# Patient Record
Sex: MALE | Race: WHITE | Employment: OTHER | URBAN - METROPOLITAN AREA
[De-identification: names, ages, dates, MRNs, and addresses within clinical notes are randomized per-mention and may not be internally consistent; named-entity substitution may affect disease eponyms.]

---

## 2021-07-19 ENCOUNTER — HOSPITAL ENCOUNTER (OUTPATIENT)
Age: 71
Discharge: HOME | End: 2021-07-21
Payer: MEDICARE

## 2021-07-19 ENCOUNTER — HOSPITAL ENCOUNTER (OUTPATIENT)
Dept: GENERAL RADIOLOGY | Age: 71
Discharge: HOME | End: 2021-07-21
Payer: MEDICARE

## 2021-07-19 DIAGNOSIS — J44.9 STAGE 4 VERY SEVERE COPD BY GOLD CLASSIFICATION (HCC): ICD-10-CM

## 2021-07-19 PROCEDURE — 71046 X-RAY EXAM CHEST 2 VIEWS: CPT

## 2022-03-21 ENCOUNTER — PREP FOR PROCEDURE (OUTPATIENT)
Dept: SURGERY | Age: 72
End: 2022-03-21

## 2022-03-21 RX ORDER — SODIUM CHLORIDE 9 MG/ML
INJECTION, SOLUTION INTRAVENOUS CONTINUOUS
Status: CANCELLED | OUTPATIENT
Start: 2022-03-21

## 2022-03-21 NOTE — H&P (VIEW-ONLY)
Date:   [facility]  Name: Britany Figueroa               : 1950 Sex: M  Age: 70 yrs  Acct#:  [id number]            CC:  Rectal bleeding and diarrhea    HPI: [The patient is a 77-year-old white male who presents with moderate rectal bleeding and diarrhea. Presently, the patient states that the symptoms are intermittent. Patient denies any antibiotic use. The patient denies well water and travel. The patient has never been treated for diverticular disease or inflammatory bowel disease. The patient denies a family history for colorectal cancer. ]    Meds Prior to Visit:  Metformin HCL     Losartan Potassium     Atorvastatin Calcium     Trelegy Ellipta     Ipratropium Bromide/Albuterol Sulfate        Allergies:  Fosamax, Ace Inhibitors    PMH:  Problem List: Screening for malignant neoplasm of colon, Diarrhea  (Health Maintenance)  Medical Problems:  Respiratory, Type 2 Diabetes  Surgical Hx:  Double Hernia - () ST E'S  Splenectomy - () ST E'S/HRITZO  Reviewed and updated. FH:  Father:  . (Hx)  Mother:  . (Hx)  Reviewed and updated. SH:  Personal Habits:  Tobacco Use: Patient is a former smoker. Alcohol: Denies alcohol use. Drug Use: Denies Drug Use. Daily Caffeine: Uses Caffeine. Reviewed and updated. ROS:  Const: Denies anorexia, anxiety, fatigue, night sweats, weight gain and weight loss. Eyes: Denies eye symptoms. ENMT: Denies ear symptoms. Denies nasal symptoms. Denies mouth or throat symptoms. CV: Denies hypertension and other cardiovascular symptoms. Resp: Denies respiratory symptoms. GI: Denies hepatitis, liver disease and other gastrointestinal symptoms. Musculo: Denies musculoskeletal symptoms. Skin: Denies skin, hair and nail symptoms. Breast: Denies breast problems. Neuro: Denies neurologic symptoms. Psych: Denies depression and substance abuse. Endocrine: Denies diabetes, kidney disease and thyroid disease.   Hema/Lymph: Denies anemia, blood

## 2022-03-21 NOTE — H&P
Date:   22[facility]PRE[facility]  Name: Elsa Lopez               : 1950 Sex: M  Age: 70 yrs  Acct#:  [id number]            CC:  Rectal bleeding and diarrhea    HPI: [The patient is a 70-year-old white male who presents with moderate rectal bleeding and diarrhea. Presently, the patient states that the symptoms are intermittent. Patient denies any antibiotic use. The patient denies well water and travel. The patient has never been treated for diverticular disease or inflammatory bowel disease. The patient denies a family history for colorectal cancer. ]    Meds Prior to Visit:  Metformin HCL     Losartan Potassium     Atorvastatin Calcium     Trelegy Ellipta     Ipratropium Bromide/Albuterol Sulfate        Allergies:  Fosamax, Ace Inhibitors    PMH:  Problem List: Screening for malignant neoplasm of colon, Diarrhea  (Health Maintenance)  Medical Problems:  Respiratory, Type 2 Diabetes  Surgical Hx:  Double Hernia - () ST E'S  Splenectomy - () ST E'S/HRITZO  Reviewed and updated. FH:  Father:  . (Hx)  Mother:  . (Hx)  Reviewed and updated. SH:  Personal Habits:  Tobacco Use: Patient is a former smoker. Alcohol: Denies alcohol use. Drug Use: Denies Drug Use. Daily Caffeine: Uses Caffeine. Reviewed and updated. ROS:  Const: Denies anorexia, anxiety, fatigue, night sweats, weight gain and weight loss. Eyes: Denies eye symptoms. ENMT: Denies ear symptoms. Denies nasal symptoms. Denies mouth or throat symptoms. CV: Denies hypertension and other cardiovascular symptoms. Resp: Denies respiratory symptoms. GI: Denies hepatitis, liver disease and other gastrointestinal symptoms. Musculo: Denies musculoskeletal symptoms. Skin: Denies skin, hair and nail symptoms. Breast: Denies breast problems. Neuro: Denies neurologic symptoms. Psych: Denies depression and substance abuse. Endocrine: Denies diabetes, kidney disease and thyroid disease.   Hema/Lymph: Denies anemia, blood disease, cancer and past transfusion. Allergy/Immuno: Denies immunosuppression. Reviewed and updated. Ht: 71\" 5'11\" Wt: 163lb BMI: 22.7    Exam:    Neck is supple without adenopathy  Lungs are clear to auscultation  Cardiac regular rate and rhythm without murmurs rubs or gallops  Abdomen is soft, nondistended and nontender  Extremities without clubbing cyanosis or edema         Assessment #1: Hx R19.7 Diarrhea, unspecified   Care Plan:                Assessment #2: Hx K92.2 Gastrointestinal hemorrhage, unspecified   Care Plan:              Comments       : The patient will undergo a full colonoscopy. I have informed him of the risks, benefits, and complications of the procedure and he is willing to proceed. Moderate complexity    I performed a updated history, physical examination, assessment and plan.             Seen by:

## 2022-03-23 RX ORDER — ATORVASTATIN CALCIUM 10 MG/1
10 TABLET, FILM COATED ORAL DAILY
COMMUNITY

## 2022-03-23 NOTE — PROGRESS NOTES
Patient has a history of difficult intubation in 2001. He states he has not had an issue since. Dr. Sera Delgado notified. No further orders.

## 2022-03-23 NOTE — PROGRESS NOTES
Have you been tested for COVID  No           Have you been told you were positive for COVID No  Have you had any known exposure to someone that is positive for COVID No  Do you have a cough                   No              Do you have shortness of breath No                 Do you have a sore throat            No                Are you having chills                    No                Are you having muscle aches. No                    Please come to the hospital wearing a mask and have your significant other wear a mask as well. Both of you should check your temperature before leaving to come here,  if it is 100 or higher please call [phone] for instruction. [facility] PRE-ADMISSION TESTING INSTRUCTIONS    The Preadmission Testing patient is instructed accordingly using the following criteria (check applicable):    ARRIVAL INSTRUCTIONS:  [x] Parking the day of Surgery is located in the Main Entrance lot. Upon entering the door, make an immediate right to the surgery reception desk    [x] Bring photo ID and insurance card    [] Bring in a copy of Living will or Durable Power of  papers.     [x] Please be sure to arrange for responsible adult to provide transportation to and from the hospital    [x] Please arrange for responsible adult to be with you for the 24 hour period post procedure due to having anesthesia      GENERAL INSTRUCTIONS:    [x] Nothing by mouth after midnight, including gum, candy, mints or water    [x] You may brush your teeth, but do not swallow any water    [] Take medications as instructed with 1-2 oz of water    [] Stop herbal supplements and vitamins 5 days prior to procedure    [] Follow preop dosing of blood thinners per physician instructions    [] Take 1/2 dose of evening insulin, but no insulin after midnight    [x] No oral diabetic medications after midnight    [x] If diabetic and have low blood sugar or feel symptomatic, take 1-2oz apple juice only    [] Bring inhalers day of surgery    [] Bring C-PAP/ Bi-Pap day of surgery    [] Bring urine specimen day of surgery    [x] Shower or bath with soap, lather and rinse well, AM of Surgery, no lotion, powders or creams to surgical site    [x] Follow bowel prep as instructed per surgeon    [x] No tobacco products within 24 hours of surgery     [x] No alcohol or illegal drug use within 24 hours of surgery.     [x] Jewelry, body piercing's, eyeglasses, contact lenses and dentures are not permitted into surgery (bring cases)      [x] Please do not wear any nail polish, make up or hair products on the day of surgery    [x] You can expect a call the business day prior to procedure to notify you if your arrival time changes    [x] If you receive a survey after surgery we would greatly appreciate your comments    [] Parent/guardian of a minor must accompany their child and remain on the premises  the entire time they are under our care     [] Pediatric patients may bring favorite toy, blanket or comfort item with them    [] A caregiver or family member must remain with the patient during their stay if they are mentally handicapped, have dementia, disoriented or unable to use a call light or would be a safety concern if left unattended    [x] Please notify surgeon if you develop any illness between now and time of surgery (cold, cough, sore throat, fever, nausea, vomiting) or any signs of infections  including skin, wounds, and dental.    [x]  The Outpatient Pharmacy is available to fill your prescription here on your day of surgery, ask your preop nurse for details    [] Other instructions    EDUCATIONAL MATERIALS PROVIDED:    [] PAT Preoperative Education Packet/Booklet     [] Medication List    [] Transfusion bracelet applied with instructions    [] Shower with soap, lather and rinse well, and use CHG wipes provided the evening before surgery as instructed    [] Incentive spirometer with instructions

## 2022-03-25 ENCOUNTER — ANESTHESIA (OUTPATIENT)
Dept: ENDOSCOPY | Age: 72
End: 2022-03-25
Payer: MEDICARE

## 2022-03-25 ENCOUNTER — ANESTHESIA EVENT (OUTPATIENT)
Dept: ENDOSCOPY | Age: 72
End: 2022-03-25
Payer: MEDICARE

## 2022-03-25 ENCOUNTER — HOSPITAL ENCOUNTER (OUTPATIENT)
Age: 72
Setting detail: OUTPATIENT SURGERY
Discharge: HOME | End: 2022-03-25
Attending: SURGERY | Admitting: SURGERY
Payer: MEDICARE

## 2022-03-25 VITALS
RESPIRATION RATE: 16 BRPM | SYSTOLIC BLOOD PRESSURE: 126 MMHG | DIASTOLIC BLOOD PRESSURE: 73 MMHG | HEIGHT: 71 IN | OXYGEN SATURATION: 97 % | HEART RATE: 73 BPM | WEIGHT: 163 LBS | BODY MASS INDEX: 22.82 KG/M2 | TEMPERATURE: 97.3 F

## 2022-03-25 VITALS
SYSTOLIC BLOOD PRESSURE: 109 MMHG | DIASTOLIC BLOOD PRESSURE: 60 MMHG | OXYGEN SATURATION: 99 % | RESPIRATION RATE: 30 BRPM

## 2022-03-25 PROCEDURE — 7100000011 HC PHASE II RECOVERY - ADDTL 15 MIN: Performed by: SURGERY

## 2022-03-25 PROCEDURE — 2580000003 HC RX 258: Performed by: SURGERY

## 2022-03-25 PROCEDURE — 87449 NOS EACH ORGANISM AG IA: CPT

## 2022-03-25 PROCEDURE — 88305 TISSUE EXAM BY PATHOLOGIST: CPT

## 2022-03-25 PROCEDURE — 87045 FECES CULTURE AEROBIC BACT: CPT

## 2022-03-25 PROCEDURE — 7100000010 HC PHASE II RECOVERY - FIRST 15 MIN: Performed by: SURGERY

## 2022-03-25 PROCEDURE — 89055 LEUKOCYTE ASSESSMENT FECAL: CPT

## 2022-03-25 PROCEDURE — 87329 GIARDIA AG IA: CPT

## 2022-03-25 PROCEDURE — 6360000002 HC RX W HCPCS: Performed by: NURSE ANESTHETIST, CERTIFIED REGISTERED

## 2022-03-25 PROCEDURE — 3609010600 HC COLONOSCOPY POLYPECTOMY SNARE/COLD BIOPSY: Performed by: SURGERY

## 2022-03-25 PROCEDURE — 3700000001 HC ADD 15 MINUTES (ANESTHESIA): Performed by: SURGERY

## 2022-03-25 PROCEDURE — 3700000000 HC ANESTHESIA ATTENDED CARE: Performed by: SURGERY

## 2022-03-25 PROCEDURE — 87328 CRYPTOSPORIDIUM AG IA: CPT

## 2022-03-25 PROCEDURE — 2709999900 HC NON-CHARGEABLE SUPPLY: Performed by: SURGERY

## 2022-03-25 PROCEDURE — 87324 CLOSTRIDIUM AG IA: CPT

## 2022-03-25 RX ORDER — PROPOFOL 10 MG/ML
INJECTION, EMULSION INTRAVENOUS PRN
Status: DISCONTINUED | OUTPATIENT
Start: 2022-03-25 | End: 2022-03-25 | Stop reason: SDUPTHER

## 2022-03-25 RX ORDER — SODIUM CHLORIDE 9 MG/ML
INJECTION, SOLUTION INTRAVENOUS CONTINUOUS
Status: DISCONTINUED | OUTPATIENT
Start: 2022-03-25 | End: 2022-03-25 | Stop reason: HOSPADM

## 2022-03-25 RX ADMIN — SODIUM CHLORIDE: 9 INJECTION, SOLUTION INTRAVENOUS at 11:46

## 2022-03-25 RX ADMIN — PROPOFOL 230 MG: 10 INJECTION, EMULSION INTRAVENOUS at 11:48

## 2022-03-25 ASSESSMENT — COPD QUESTIONNAIRES: CAT_SEVERITY: SEVERE

## 2022-03-25 ASSESSMENT — PAIN SCALES - GENERAL
PAINLEVEL_OUTOF10: 0

## 2022-03-25 NOTE — OP NOTE
Operative Note      Patient: Ziyad Lee  YOB: 1950  MRN: [id number]    Date of Procedure: 3/25/2022    Pre-Op Diagnosis: RECTAL BLEED    Post-Op Diagnosis:     Polyp involving the ascending colon  Nonspecific colitis  Diverticular disease       Procedure(s):  COLONOSCOPY DIAGNOSTIC    Surgeon(s):  Victor Manuel Manuel MD    Assistant:   * No surgical staff found *    Anesthesia: Monitor Anesthesia Care    Estimated Blood Loss (mL): Minimal    Complications: None    Specimens:   ID Type Source Tests Collected by Time Destination   1 : stool Stool Colon CULTURE, STOOL Victor Manuel Manuel MD 3/25/2022 1153    A : cold snare polyp ascending colon Tissue Colon SURGICAL PATHOLOGY Victor Manuel Manuel MD 3/25/2022 1154    B : random colon bx Tissue Colon SURGICAL PATHOLOGY Victor Manuel Manuel MD 3/25/2022 1157        Implants:  * No implants in log *      Drains: * No LDAs found *    Findings: As above      Detailed Description of Procedure: The patient was brought to the endoscopy suite and placed on the table in the left lateral decubitus position. Patient received anesthesia per the department of anesthesiology. A digital rectal exam was performed. No gross findings were noted. The scope was inserted and passed without difficulty through the entire length of the colon. The cecum was photo documented. The endoscope was retrieved. The patient a polyp involving the ascending colon which was removed with a snare technique without cautery. The patient random biopsies obtained of the colon. Cultures were obtained. The cultures were placed in the container. The patient had left-sided diverticular disease. The rectum was normal.  The patient tolerated procedure well.     Assessment:    Nonspecific colitis  Polyp involving ascending colon    Plan:    Await pathology    Electronically signed by Victor Manuel Manuel MD on 3/25/2022 at 12:02 PM

## 2022-03-25 NOTE — INTERVAL H&P NOTE
Update History & Physical    The patient's History and Physical of March 25, 2022 was reviewed with the patient and I examined the patient. There was no change. The surgical site was confirmed by the patient and me. Plan: The risks, benefits, expected outcome, and alternative to the recommended procedure have been discussed with the patient. Patient understands and wants to proceed with the procedure.      Electronically signed by Emma Aguilera MD on 3/25/2022 at 10:34 AM

## 2022-03-25 NOTE — ANESTHESIA POSTPROCEDURE EVALUATION
Department of Anesthesiology  Postprocedure Note    Patient: Arnaldo Andrade  MRN: [id number]  YOB: 1950  Date of evaluation: 3/25/2022  Time:  12:37 PM     Procedure Summary     Date: 03/25/22 Room / Location: [facility] / [facility]    Anesthesia Start: 1024 Anesthesia Stop: 0707    Procedure: COLONOSCOPY DIAGNOSTIC (N/A ) Diagnosis: (RECTAL BLEED)    Surgeons: Carol Palacio MD Responsible Provider: Samuel Ford MD    Anesthesia Type: MAC ASA Status: 4          Anesthesia Type: MAC    Meaghan Phase I: Meaghan Score: 8    Meaghan Phase II:      Last vitals: Reviewed and per EMR flowsheets.        Anesthesia Post Evaluation    Patient location during evaluation: PACU  Patient participation: complete - patient participated  Level of consciousness: awake and alert  Airway patency: patent  Nausea & Vomiting: no nausea and no vomiting  Complications: no  Cardiovascular status: hemodynamically stable  Respiratory status: acceptable  Hydration status: euvolemic

## 2022-03-25 NOTE — ANESTHESIA PRE PROCEDURE
Department of Anesthesiology  Preprocedure Note       Name:  Saúl Camacho   Age:  70 y.o.  :  1950                                          MRN:  [id number]         Date:  3/25/2022      Surgeon: Elisa Almeida):  Jazmyn Mas MD    Procedure: Procedure(s):  COLONOSCOPY DIAGNOSTIC    Medications prior to admission:   Prior to Admission medications    Medication Sig Start Date End Date Taking? Authorizing Provider   atorvastatin (LIPITOR) 10 MG tablet Take 10 mg by mouth daily   Yes Historical Provider, MD   EPINEPHrine (PRIMATENE MIST IN) Inhale into the lungs   Yes Historical Provider, MD   fluticasone-umeclidin-vilant (TRELEGY ELLIPTA) 100-62.5-25 MCG/INH AEPB Inhale 1 puff into the lungs daily 10/5/21   Nemesio Saldivar MD   losartan (COZAAR) 25 MG tablet Take 25 mg by mouth daily    Historical Provider, MD   METFORMIN HCL Take 500 mg by mouth daily. Historical Provider, MD   ipratropium-albuterol (DUONEB) 0.5-2.5 (3) MG/3ML SOLN nebulizer solution Take 1 vial by nebulization 2 times daily     Historical Provider, MD   OXYGEN Inhale 3 L into the lungs continuous. ON PULSE DURING DAY (ACTIVITY); CONSENTRATE AT HS     Historical Provider, MD       Current medications:    Current Facility-Administered Medications   Medication Dose Route Frequency Provider Last Rate Last Admin    0.9 % sodium chloride infusion   IntraVENous Continuous Jazmyn Mas MD           Allergies:     Allergies   Allergen Reactions    Alendronate Sodium Swelling and Other (See Comments)     SKIN \"BLEEDS OUT\"    Biaxin [Clarithromycin] Swelling and Rash       Problem List:    Patient Active Problem List   Diagnosis Code    Hypertrophy, nasal, turbinate J34.3       Past Medical History:        Diagnosis Date    Asthma     Bronchiolitis     [facility]     Diabetes mellitus ([facility]     Diarrhea     Difficult intubation     GERD (gastroesophageal reflux disease)     Hyperlipidemia     Hypertension     Nausea & vomiting     \"WITH GAS\"    Rectal bleeding        Past Surgical History:        Procedure Laterality Date    BRONCHOSCOPY      HERNIA REPAIR  2009    NASAL ENDOSCOPY  2011    bilateral inferior, middle turbinoplasty with laser    OTHER SURGICAL HISTORY      NASAL SURGERY X2, SLEENECTOMY    SPLENECTOMY         Social History:    Social History     Tobacco Use    Smoking status: Former Smoker     Packs/day: 0.50     Years: 40.00     Pack years: 20.00     Quit date: 2002     Years since quittin.2    Smokeless tobacco: Never Used   Substance Use Topics    Alcohol use: No                                Counseling given: Not Answered      Vital Signs (Current):   Vitals:    22 1024   Weight: 163 lb (73.9 kg)   Height: 5' 11\" (1.803 m)                                              BP Readings from Last 3 Encounters:   21 126/70   20 120/78   19 124/80       NPO Status:                                                                                 BMI:   Wt Readings from Last 3 Encounters:   22 163 lb (73.9 kg)   21 164 lb (74.4 kg)   20 177 lb (80.3 kg)     Body mass index is 22.73 kg/m². CBC: No results found for: WBC, RBC, HGB, HCT, MCV, RDW, PLT    CMP: No results found for: NA, K, CL, CO2, BUN, CREATININE, GFRAA, AGRATIO, LABGLOM, GLUCOSE, GLU, PROT, CALCIUM, BILITOT, ALKPHOS, AST, ALT    POC Tests: No results for input(s): POCGLU, POCNA, POCK, POCCL, POCBUN, POCHEMO, POCHCT in the last 72 hours.     Coags: No results found for: PROTIME, INR, APTT    HCG (If Applicable):   Lab Results   Component Value Date    HCG <2.0 2012        ABGs: No results found for: PHART, PO2ART, BXM7EWS, VRF6OYK, BEART, G4FIFDAY     Type & Screen (If Applicable):  No results found for: LABABO, LABRH    Drug/Infectious Status (If Applicable):  No results found for: HIV, HEPCAB    COVID-19 Screening (If Applicable): No results found for: COVID19        Anesthesia Evaluation  Patient summary reviewed history of anesthetic complications:   Airway: Mallampati: I  TM distance: >3 FB   Neck ROM: full   Dental:          Pulmonary:   (+) COPD: severe,  decreased breath sounds,  asthma:                           ROS comment: Former Smoker  Chronic hypoxic resp failure   Cardiovascular:    (+) hypertension:, hyperlipidemia        Rhythm: regular  Rate: normal           Beta Blocker:  Not on Beta Blocker         Neuro/Psych:   Negative Neuro/Psych ROS              GI/Hepatic/Renal:   (+) GERD:, bowel prep,           Endo/Other:    (+) DiabetesType II DM, , .                 Abdominal:             Vascular: Other Findings:           Anesthesia Plan      MAC     ASA 4       Induction: intravenous. Anesthetic plan and risks discussed with patient. Plan discussed with CRNA.                 Juma Mojica MD   3/25/2022

## 2022-03-26 LAB
C DIFF TOXIN/ANTIGEN: NORMAL
WHITE BLOOD CELLS (WBC), STOOL: NORMAL

## 2022-03-27 LAB — CULTURE, STOOL: NORMAL

## 2022-03-28 LAB
CRYPTOSPORIDIUM ANTIGEN STOOL: NORMAL
GIARDIA ANTIGEN STOOL: NORMAL

## 2023-09-21 ENCOUNTER — HOSPITAL ENCOUNTER (OUTPATIENT)
Dept: PULMONOLOGY | Age: 73
Discharge: HOME | End: 2023-09-21
Attending: INTERNAL MEDICINE
Payer: MEDICARE

## 2023-09-21 DIAGNOSIS — J44.9 STAGE 4 VERY SEVERE COPD BY GOLD CLASSIFICATION (HCC): ICD-10-CM

## 2023-09-21 PROCEDURE — 94729 DIFFUSING CAPACITY: CPT

## 2023-09-21 PROCEDURE — 94726 PLETHYSMOGRAPHY LUNG VOLUMES: CPT

## 2023-09-21 PROCEDURE — 94060 EVALUATION OF WHEEZING: CPT

## 2023-10-10 ENCOUNTER — HOSPITAL ENCOUNTER (OUTPATIENT)
Dept: CT IMAGING | Age: 73
Discharge: HOME | End: 2023-10-12
Attending: INTERNAL MEDICINE
Payer: MEDICARE

## 2023-10-10 DIAGNOSIS — J44.9 CHRONIC OBSTRUCTIVE PULMONARY DISEASE, UNSPECIFIED COPD TYPE (HCC): ICD-10-CM

## 2023-10-10 PROCEDURE — 71250 CT THORAX DX C-: CPT

## 2024-12-06 ENCOUNTER — HOSPITAL ENCOUNTER (OUTPATIENT)
Dept: CT IMAGING | Age: 74
Discharge: HOME | End: 2024-12-08
Attending: INTERNAL MEDICINE
Payer: MEDICARE

## 2024-12-06 ENCOUNTER — HOSPITAL ENCOUNTER (OUTPATIENT)
Age: 74
Discharge: HOME | End: 2024-12-06
Attending: INTERNAL MEDICINE
Payer: MEDICARE

## 2024-12-06 DIAGNOSIS — R91.1 INCIDENTAL LUNG NODULE, > 3MM AND < 8MM: ICD-10-CM

## 2024-12-06 LAB
B.E.: 5.2 MMOL/L (ref -3–3)
COHB: 0.9 % (ref 0–1.5)
CRITICAL: ABNORMAL
DATE ANALYZED: ABNORMAL
DATE OF COLLECTION: ABNORMAL
HCO3: 30.8 MMOL/L (ref 22–26)
HHB: 7.6 % (ref 0–5)
LAB: ABNORMAL
Lab: 1555
METHB: 0.3 % (ref 0–1.5)
MODE: ABNORMAL
O2 CONTENT: 19.6 ML/DL
O2 SATURATION: 92.3 % (ref 92–98.5)
O2HB: 91.2 % (ref 94–97)
OPERATOR ID: 46
PATIENT TEMP: 37 C
PCO2: 48.2 MMHG (ref 35–45)
PH BLOOD GAS: 7.42 (ref 7.35–7.45)
PO2: 60.3 MMHG (ref 75–100)
SOURCE, BLOOD GAS: ABNORMAL
THB: 15.3 G/DL (ref 11.5–16.5)
TIME ANALYZED: 1555

## 2024-12-06 PROCEDURE — 71250 CT THORAX DX C-: CPT

## 2024-12-06 PROCEDURE — 82805 BLOOD GASES W/O2 SATURATION: CPT

## (undated) DEVICE — GRADUATE TRIANG MEASURE 1000ML BLK PRNT

## (undated) DEVICE — SPONGE GZ W4XL4IN RAYON POLY FILL CVR W/ NONWOVEN FAB